# Patient Record
Sex: MALE | Race: OTHER | NOT HISPANIC OR LATINO
[De-identification: names, ages, dates, MRNs, and addresses within clinical notes are randomized per-mention and may not be internally consistent; named-entity substitution may affect disease eponyms.]

---

## 2022-07-27 PROBLEM — Z00.00 ENCOUNTER FOR PREVENTIVE HEALTH EXAMINATION: Status: ACTIVE | Noted: 2022-07-27

## 2023-07-14 ENCOUNTER — APPOINTMENT (OUTPATIENT)
Dept: NEUROLOGY | Facility: CLINIC | Age: 53
End: 2023-07-14

## 2024-02-09 ENCOUNTER — APPOINTMENT (OUTPATIENT)
Dept: NEUROLOGY | Facility: CLINIC | Age: 54
End: 2024-02-09
Payer: MEDICAID

## 2024-02-09 VITALS
HEIGHT: 66 IN | DIASTOLIC BLOOD PRESSURE: 84 MMHG | SYSTOLIC BLOOD PRESSURE: 129 MMHG | HEART RATE: 86 BPM | TEMPERATURE: 98 F | WEIGHT: 141 LBS | OXYGEN SATURATION: 96 % | BODY MASS INDEX: 22.66 KG/M2

## 2024-02-09 PROCEDURE — 99204 OFFICE O/P NEW MOD 45 MIN: CPT

## 2024-02-14 NOTE — HISTORY OF PRESENT ILLNESS
[FreeTextEntry1] : Zaid is a 53 year old Polish speaking male presenting with history of migraines. Accompanied by his mother, who helped translate.  Headaches started 10 years ago while living in [place]. Presented with R sided facial pain, around his R orbit radiating upward to the top of his head. No tearing or nasal discharge. Describes the pain as pressure. Denies hypersensitivities. MRI/MRA 6 years ago normal per pt.  Pain very severe, took various pain medications prescribed in [place] including Sumamigren (Sumatriptan). Prescribed daily preventative for several years (unsure of the name), which completely took away headaches. Prior physician retired, pt stopped medication and headaches returned. Started taking over the counter Migrelief BID which has taken away headaches for the past year. Pt hesitant to continue this medication daily, but also very concerned about headaches coming back.   [place] in the [place] for the past 3 years.

## 2024-02-14 NOTE — PHYSICAL EXAM
[FreeTextEntry1] : General: Constitutional: Sitting comfortably in NAD Psychiatric: well-groomed, appropriate affect, insight/judgement intact Ears, Nose, Throat: no abnormalities, mucus membranes moist Extremities: no edema, clubbing, or cyanosis Skin: no rash or neurocutaneous signs  Cognitive: Orientation, language, memory and knowledge screens intact Cranial Nerves: II: Full to confrontation; III/IV/VI: PERRL EOMI, no nystagmus [id number]: Symmetric. VII: Face appears symmetric. VIII: Normal to screening, IX/X: Palate elevates symmetrical. XI: Trapezius symmetric. XII: Tongue midline  Motor: Power: 5/5 throughout Tone: Normal x4 limbs Tremor: none  Sensation: intact to light touch  Coordination/Gait: Finger-nose-finger intact, normal rapid-alternating movements Fine motor normal with normal rapid finger taps and heel tapping Romberg negative

## 2024-02-14 NOTE — DISCUSSION/SUMMARY
[FreeTextEntry1] : Impression: 1) Chronic migraine without aura - history of debilitating migraines in the past, fearful of a recurrence.   Controlled with Migrelief BID for the past year. Had used a version of sumatriptan from [place] for breakthrough headaches.  Pt concerned about continuing this med daily, interested in alternative for daily headache prevention   Plan: 1) Trial of Topiramate 25-75mg for daily headache prevention  2) Sumatriptan prn

## 2024-02-14 NOTE — END OF VISIT
[FreeTextEntry3] : I personally saw and evaluated this patient with COMF[facility] Dorado and agree with the history, exam, and plan as outlined in this note.

## 2024-03-05 ENCOUNTER — NON-APPOINTMENT (OUTPATIENT)
Age: 54
End: 2024-03-05

## 2024-03-05 ENCOUNTER — APPOINTMENT (OUTPATIENT)
Dept: NEUROLOGY | Facility: CLINIC | Age: 54
End: 2024-03-05
Payer: MEDICAID

## 2024-03-05 VITALS
RESPIRATION RATE: 17 BRPM | HEART RATE: 75 BPM | WEIGHT: 171 LBS | BODY MASS INDEX: 27.48 KG/M2 | SYSTOLIC BLOOD PRESSURE: 129 MMHG | TEMPERATURE: 98.4 F | OXYGEN SATURATION: 97 % | DIASTOLIC BLOOD PRESSURE: 75 MMHG | HEIGHT: 66 IN

## 2024-03-05 PROCEDURE — 99214 OFFICE O/P EST MOD 30 MIN: CPT

## 2024-03-05 RX ORDER — SUMATRIPTAN 100 MG/1
100 TABLET, FILM COATED ORAL
Qty: 8 | Refills: 0 | Status: ACTIVE | COMMUNITY
Start: 2024-02-09 | End: 1900-01-01

## 2024-03-05 NOTE — HISTORY OF PRESENT ILLNESS
[FreeTextEntry1] : 3/5/24 HPI: Zaid is a 53 year old male presenting for a follow up visit for migraines. Accompanied by his mother, who helped translate.   Started Topiramate for migraine prevention, now up to 50mg daily. Experiencing occasional headaches, unsure exactly how many he has had this month. Taking Sumatriptan and Excedrin prn, notes he has taken Sumatriptan about 5 times.   Found past medications from [place]. Previously on Depakote 500mg along with Sumatriptan prn. Notes Depakote was a very effective headache preventative.   Traveling to [place] from July - October, scheduled for dental surgery.  ----------------------- Last seen 2/9/24: Headaches started 10 years ago while living in [place]. Presented with R sided facial pain, around his R orbit radiating upward to the top of his head. No tearing or nasal discharge. Describes the pain as pressure. Denies hypersensitivities. MRI/MRA 6 years ago normal per pt.  Pain very severe, took various pain medications prescribed in [place] including Sumamigren (Sumatriptan). Prescribed daily preventative for several years (unsure of the name), which completely took away headaches. Prior physician retired, pt stopped medication and headaches returned. Started taking over the counter Migrelief BID which has taken away headaches for the past year. Pt hesitant to continue this medication daily, but also very concerned about headaches coming back.  [place] the [place] past [place].

## 2024-03-05 NOTE — PHYSICAL EXAM
[FreeTextEntry1] : General: Constitutional: Sitting comfortably in NAD. Psychiatric: well-groomed, appropriate affect Ears, Nose, Throat: no abnormalities, mucus membranes moist Neck: supple Extremities: no edema, clubbing or cyanosis Skin: no rash or neuro-cutaneous signs  Cognitive: Orientation, language, memory and knowledge screens intact.  Cranial Nerves: II: LAUREN. III/IV/VI: EOM Full. VII: Face appears symmetric. VIII: Normal to screening. IX/X: Normal phonation. XI: Trapezius Symmetric. XII: Tongue midline.  Motor: Power: No pronator drift.  Normal gait.

## 2024-03-05 NOTE — REVIEW OF SYSTEMS
[Fever] : no fever [Chills] : no chills [Chest Pain] : no chest pain [As Noted in HPI] : as noted in HPI [Shortness Of Breath] : no shortness of breath [Abdominal Pain] : no abdominal pain

## 2024-03-05 NOTE — DISCUSSION/SUMMARY
[FreeTextEntry1] : Impression: 1) Chronic migraine without aura - history of debilitating migraines in the past, fearful of a recurrence.  Controlled with Migrelief BID for the past year. Had used a version of sumatriptan from [place] for breakthrough headaches. Started Topiramate 50mg as a new preventative, but now experiencing occasional headaches   Plan: 1) Increase Topiramate to 50mg BID. Continue Sumatriptan prn  2) Encouraged pt to keep a headache diary to track exactly how many headaches he is getting each month 3) Follow up in 4-6 weeks

## 2024-04-05 ENCOUNTER — RX RENEWAL (OUTPATIENT)
Age: 54
End: 2024-04-05

## 2024-04-25 ENCOUNTER — RX RENEWAL (OUTPATIENT)
Age: 54
End: 2024-04-25

## 2024-04-25 ENCOUNTER — APPOINTMENT (OUTPATIENT)
Dept: NEUROLOGY | Facility: CLINIC | Age: 54
End: 2024-04-25
Payer: MEDICAID

## 2024-04-25 VITALS
HEIGHT: 66 IN | TEMPERATURE: 98.3 F | BODY MASS INDEX: 27.64 KG/M2 | SYSTOLIC BLOOD PRESSURE: 124 MMHG | WEIGHT: 172 LBS | DIASTOLIC BLOOD PRESSURE: 78 MMHG | HEART RATE: 85 BPM | OXYGEN SATURATION: 95 %

## 2024-04-25 DIAGNOSIS — R09.81 NASAL CONGESTION: ICD-10-CM

## 2024-04-25 DIAGNOSIS — G43.719 CHRONIC MIGRAINE W/OUT AURA, INTRACTABLE, W/OUT STATUS MIGRAINOSUS: ICD-10-CM

## 2024-04-25 PROCEDURE — 99214 OFFICE O/P EST MOD 30 MIN: CPT

## 2024-04-25 RX ORDER — RIZATRIPTAN BENZOATE 10 MG/1
10 TABLET ORAL
Qty: 9 | Refills: 1 | Status: ACTIVE | COMMUNITY
Start: 2024-04-25 | End: 1900-01-01

## 2024-04-25 NOTE — HISTORY OF PRESENT ILLNESS
[FreeTextEntry1] : 4/25/24 HPI: Zaid is a 54 year old male presenting for a follow up visit for migraines. Accompanied by his mother. [employer] services utilized for Polish translation.   Experiencing headaches about once every 2-3 days on Topiramate 50mg BID. More often than when he was on 50mg daily, attributes this to changes in weather and allergies. Wakes up with pain behind his eyes and in his sinuses, improves throughout the morning. Pain has increased in severity since the beginning of the year.   Taking Migrilief every 2-3 days. Stopped Sumatriptan because it is no longer working.  --------------------------- Last seen 3/5/24: Started Topiramate for migraine prevention, now up to 50mg daily. Experiencing occasional headaches, unsure exactly how many he has had this month. Taking Sumatriptan and Excedrin prn, notes he has taken Sumatriptan about 5 times.  Found past medications from [place]. Previously on Depakote 500mg along with Sumatriptan prn. Notes Depakote was a very effective headache preventative.  Traveling to [place] from July - October, scheduled for dental surgery. ----------------------- Last seen 2/9/24: Headaches started 10 years ago while living in [place]. Presented with R sided facial pain, around his R orbit radiating upward to the top of his head. No tearing or nasal discharge. Describes the pain as pressure. Denies hypersensitivities. MRI/MRA 6 years ago normal per pt.  Pain very severe, took various pain medications prescribed in [place] including Sumamigren (Sumatriptan). Prescribed daily preventative for several years (unsure of the name), which completely took away headaches. Prior physician retired, pt stopped medication and headaches returned. Started taking over the counter Migrelief BID which has taken away headaches for the past year. Pt hesitant to continue this medication daily, but also very concerned about headaches coming back.  [place] the [place].

## 2024-04-25 NOTE — DISCUSSION/SUMMARY
[FreeTextEntry1] : Impression: 1) Chronic migraine without aura - history of debilitating migraines in the past, now starting to reoccur off of Migrilief twice daily. Started Topiramate 50mg as a new preventative, up to 50mg BID but experiencing headaches every 2-3 days. Taking Migrilief as needed. No improvement with Sumatriptan  2) Sinus issues - may be contributing to worsening headaches   Plan: 1) MRI brain for worsening headaches 2) Increase Topiramate to 50mg / 100mg  3) Trial of Rizatriptan prn 4) Follow up with ENT for sinus congestion

## 2024-05-18 ENCOUNTER — OUTPATIENT (OUTPATIENT)
Dept: OUTPATIENT SERVICES | Facility: HOSPITAL | Age: 54
LOS: 1 days | End: 2024-05-18
Payer: MEDICAID

## 2024-05-18 ENCOUNTER — APPOINTMENT (OUTPATIENT)
Dept: MRI IMAGING | Facility: HOSPITAL | Age: 54
End: 2024-05-18

## 2024-05-18 PROCEDURE — 70551 MRI BRAIN STEM W/O DYE: CPT

## 2024-05-18 PROCEDURE — 70551 MRI BRAIN STEM W/O DYE: CPT | Mod: 26

## 2024-06-03 ENCOUNTER — NON-APPOINTMENT (OUTPATIENT)
Age: 54
End: 2024-06-03

## 2024-06-04 ENCOUNTER — APPOINTMENT (OUTPATIENT)
Dept: OTOLARYNGOLOGY | Facility: CLINIC | Age: 54
End: 2024-06-04
Payer: MEDICAID

## 2024-06-04 VITALS
WEIGHT: 174.16 LBS | OXYGEN SATURATION: 95 % | HEIGHT: 66 IN | BODY MASS INDEX: 27.99 KG/M2 | HEART RATE: 78 BPM | TEMPERATURE: 100 F | DIASTOLIC BLOOD PRESSURE: 89 MMHG | SYSTOLIC BLOOD PRESSURE: 144 MMHG

## 2024-06-04 DIAGNOSIS — J34.89 OTHER SPECIFIED DISORDERS OF NOSE AND NASAL SINUSES: ICD-10-CM

## 2024-06-04 DIAGNOSIS — F17.200 NICOTINE DEPENDENCE, UNSPECIFIED, UNCOMPLICATED: ICD-10-CM

## 2024-06-04 DIAGNOSIS — M95.0 ACQUIRED DEFORMITY OF NOSE: ICD-10-CM

## 2024-06-04 DIAGNOSIS — Z80.3 FAMILY HISTORY OF MALIGNANT NEOPLASM OF BREAST: ICD-10-CM

## 2024-06-04 DIAGNOSIS — J34.2 DEVIATED NASAL SEPTUM: ICD-10-CM

## 2024-06-04 DIAGNOSIS — R09.81 NASAL CONGESTION: ICD-10-CM

## 2024-06-04 PROCEDURE — 31231 NASAL ENDOSCOPY DX: CPT

## 2024-06-04 PROCEDURE — 99205 OFFICE O/P NEW HI 60 MIN: CPT | Mod: 25

## 2024-06-04 RX ORDER — FLUTICASONE PROPIONATE 50 UG/1
50 SPRAY, METERED NASAL DAILY
Qty: 3 | Refills: 1 | Status: ACTIVE | COMMUNITY
Start: 2024-06-04 | End: 1900-01-01

## 2024-06-04 RX ORDER — ALLOPURINOL 200 MG/1
200 TABLET ORAL
Refills: 0 | Status: ACTIVE | COMMUNITY

## 2024-06-04 NOTE — PROCEDURE
[FreeTextEntry6] : -  Pre-operative Diagnosis: Nasal congestion  Post-operative Diagnosis: left septal deviation, turbinate hypertrophy Anesthesia: Topical Procedure: Bilateral nasal endoscopy Procedure Details:   After topical anesthesia and decongestant, the patient was placed in the supine position. The telescope was passed along the left nasal floor to the nasopharynx. It was then passed into the region of the middle meatus, middle turbinate, and the sphenoethmoid region. An identical procedure was performed on the right side.   Findings: Mucosa:   normal Nasal septum: left septal deviation  Discharge:  none Turbinates: Hypertrophy Adenoid:   normal Posterior choanae:  normal Eustachian tubes:  normal  Mucous stranding:  normal   Lesions:   Not present   Comments:   Condition: Stable. Patient tolerated procedure well.

## 2024-06-04 NOTE — HISTORY OF PRESENT ILLNESS
[de-identified] : 6/4/24: 55 y/o M referred by his neurologist presents with concern for nasal congestion for the past 6-8 years. He is seeing neurology for migraines and had recent MRI consistent with mucus retention cysts. He feels his nose is obstructed left> right and feels sense of smell is limited. He denies rhinorrhea. He has history of sinus lift in [place] approximately 1 year ago because of dental issues. He has not used anything in terms of treatment. Current smoker.

## 2024-06-04 NOTE — PHYSICAL EXAM
[] : septum deviated to the left [Normal] : mucosa is normal [Midline] : trachea located in midline position [de-identified] : + judy maneuver bilaterally  [de-identified] : Hypertrophy

## 2024-06-04 NOTE — ASSESSMENT
[FreeTextEntry1] : - 6/4/24: 55 y/o M referred by his neurologist presents with concern for nasal congestion for the past 6-8 years. He is seeing neurology for migraines and had recent MRI. We reviewed MRI which revealed small polyp vs retention cyst in both maxillary sinuses. He feels his nose is obstructed left> right and feels sense of smell is limited. On physical exam/ nasal endoscopy he was found to have left septal deviation and +Niraj maneuver bilaterally consistent with nasal valving. I am recommending nasal saline rinses and nasal steroids. I recommended he continue to follow up with neurologist for headaches. I also recommended smoking cessation. Follow up in 4-6 weeks. If symptoms persist consider septoplasty +/- nasal valve repair and CT sinuses.   -Nasal saline rinses and nasal steroids -Continue to follow up with neurologist -Smoking cessation  -Follow up in 4-6 weeks  -If symptoms persist consider CT sinus and surgical intervention including septoplasty turbinectomy +/- endoscopic sinus surgery

## 2024-06-07 ENCOUNTER — APPOINTMENT (OUTPATIENT)
Dept: NEUROLOGY | Facility: CLINIC | Age: 54
End: 2024-06-07

## 2024-06-07 VITALS
SYSTOLIC BLOOD PRESSURE: 134 MMHG | DIASTOLIC BLOOD PRESSURE: 88 MMHG | TEMPERATURE: 98.4 F | OXYGEN SATURATION: 94 % | HEIGHT: 66 IN | HEART RATE: 72 BPM | BODY MASS INDEX: 27.16 KG/M2 | WEIGHT: 169 LBS

## 2024-06-07 PROCEDURE — 99214 OFFICE O/P EST MOD 30 MIN: CPT

## 2024-06-07 RX ORDER — TOPIRAMATE 100 MG/1
100 TABLET, FILM COATED ORAL TWICE DAILY
Qty: 60 | Refills: 6 | Status: ACTIVE | COMMUNITY
Start: 2024-02-09 | End: 1900-01-01

## 2024-06-07 RX ORDER — RIMEGEPANT SULFATE 75 MG/75MG
75 TABLET, ORALLY DISINTEGRATING ORAL DAILY
Qty: 8 | Refills: 3 | Status: ACTIVE | COMMUNITY
Start: 2024-06-07 | End: 1900-01-01

## 2024-06-07 NOTE — DISCUSSION/SUMMARY
[FreeTextEntry1] : Impression: 1) Chronic migraine without aura - history of debilitating migraines in the past, now starting to reoccur off of Migrilief twice daily. Started Topiramate 50mg as a new preventative, up to 50mg/100mg but experiencing daily headaches, up to 3x per day  2) Sinus issues - may be contributing to worsening headaches  Plan: 1) Increase Topiramate to 100mg BID 2) Trial of Nurtec prn

## 2024-06-07 NOTE — END OF VISIT
[FreeTextEntry3] : I, Melanie Dorado, attest that this documentation has been prepared under the direction in and the presence of provider Skyler Estrada MD.

## 2024-07-10 ENCOUNTER — APPOINTMENT (OUTPATIENT)
Dept: NEUROLOGY | Facility: CLINIC | Age: 54
End: 2024-07-10
Payer: MEDICAID

## 2024-07-10 VITALS
WEIGHT: 168 LBS | HEART RATE: 64 BPM | RESPIRATION RATE: 17 BRPM | BODY MASS INDEX: 27.12 KG/M2 | TEMPERATURE: 98.3 F | OXYGEN SATURATION: 96 % | SYSTOLIC BLOOD PRESSURE: 130 MMHG | DIASTOLIC BLOOD PRESSURE: 85 MMHG

## 2024-07-10 DIAGNOSIS — G43.719 CHRONIC MIGRAINE W/OUT AURA, INTRACTABLE, W/OUT STATUS MIGRAINOSUS: ICD-10-CM

## 2024-07-10 PROCEDURE — 99214 OFFICE O/P EST MOD 30 MIN: CPT

## 2024-07-16 ENCOUNTER — APPOINTMENT (OUTPATIENT)
Dept: OTOLARYNGOLOGY | Facility: CLINIC | Age: 54
End: 2024-07-16

## 2024-10-21 ENCOUNTER — APPOINTMENT (OUTPATIENT)
Dept: NEUROLOGY | Facility: CLINIC | Age: 54
End: 2024-10-21

## 2025-01-03 ENCOUNTER — APPOINTMENT (OUTPATIENT)
Dept: NEUROLOGY | Facility: CLINIC | Age: 55
End: 2025-01-03

## 2025-01-03 VITALS
DIASTOLIC BLOOD PRESSURE: 98 MMHG | HEART RATE: 77 BPM | SYSTOLIC BLOOD PRESSURE: 156 MMHG | TEMPERATURE: 98 F | OXYGEN SATURATION: 97 % | HEIGHT: 66 IN | WEIGHT: 171.96 LBS | BODY MASS INDEX: 27.64 KG/M2

## 2025-01-03 DIAGNOSIS — G43.719 CHRONIC MIGRAINE W/OUT AURA, INTRACTABLE, W/OUT STATUS MIGRAINOSUS: ICD-10-CM

## 2025-01-03 PROCEDURE — 99214 OFFICE O/P EST MOD 30 MIN: CPT

## 2025-01-03 PROCEDURE — G2211 COMPLEX E/M VISIT ADD ON: CPT | Mod: NC

## 2025-01-06 RX ORDER — GALCANEZUMAB 120 MG/ML
120 INJECTION, SOLUTION SUBCUTANEOUS
Qty: 2 | Refills: 0 | Status: ACTIVE | COMMUNITY
Start: 2025-01-03 | End: 1900-01-01

## 2025-05-09 ENCOUNTER — APPOINTMENT (OUTPATIENT)
Facility: CLINIC | Age: 55
End: 2025-05-09

## 2025-05-14 ENCOUNTER — APPOINTMENT (OUTPATIENT)
Dept: NEUROLOGY | Facility: CLINIC | Age: 55
End: 2025-05-14
Payer: MEDICAID

## 2025-05-14 ENCOUNTER — NON-APPOINTMENT (OUTPATIENT)
Age: 55
End: 2025-05-14

## 2025-05-14 VITALS
OXYGEN SATURATION: 99 % | BODY MASS INDEX: 23.87 KG/M2 | SYSTOLIC BLOOD PRESSURE: 119 MMHG | HEART RATE: 71 BPM | HEIGHT: 69.29 IN | WEIGHT: 163 LBS | DIASTOLIC BLOOD PRESSURE: 78 MMHG | TEMPERATURE: 97.7 F

## 2025-05-14 DIAGNOSIS — G43.719 CHRONIC MIGRAINE W/OUT AURA, INTRACTABLE, W/OUT STATUS MIGRAINOSUS: ICD-10-CM

## 2025-05-14 PROCEDURE — 99214 OFFICE O/P EST MOD 30 MIN: CPT

## 2025-09-17 ENCOUNTER — APPOINTMENT (OUTPATIENT)
Dept: NEUROLOGY | Facility: CLINIC | Age: 55
End: 2025-09-17
Payer: MEDICAID

## 2025-09-17 VITALS
BODY MASS INDEX: 24.02 KG/M2 | SYSTOLIC BLOOD PRESSURE: 131 MMHG | OXYGEN SATURATION: 96 % | WEIGHT: 164 LBS | DIASTOLIC BLOOD PRESSURE: 87 MMHG | HEIGHT: 69.29 IN | HEART RATE: 71 BPM

## 2025-09-17 DIAGNOSIS — J34.89 OTHER SPECIFIED DISORDERS OF NOSE AND NASAL SINUSES: ICD-10-CM

## 2025-09-17 DIAGNOSIS — G43.719 CHRONIC MIGRAINE W/OUT AURA, INTRACTABLE, W/OUT STATUS MIGRAINOSUS: ICD-10-CM

## 2025-09-17 PROCEDURE — 99214 OFFICE O/P EST MOD 30 MIN: CPT

## 2025-09-17 PROCEDURE — G2211 COMPLEX E/M VISIT ADD ON: CPT | Mod: NC
